# Patient Record
Sex: FEMALE | Race: WHITE | NOT HISPANIC OR LATINO | URBAN - METROPOLITAN AREA
[De-identification: names, ages, dates, MRNs, and addresses within clinical notes are randomized per-mention and may not be internally consistent; named-entity substitution may affect disease eponyms.]

---

## 2017-09-26 ENCOUNTER — OFFICE (OUTPATIENT)
Dept: URBAN - METROPOLITAN AREA CLINIC 11 | Facility: CLINIC | Age: 32
End: 2017-09-26

## 2017-09-26 VITALS
HEART RATE: 75 BPM | HEIGHT: 67 IN | SYSTOLIC BLOOD PRESSURE: 123 MMHG | DIASTOLIC BLOOD PRESSURE: 72 MMHG | WEIGHT: 195 LBS

## 2017-09-26 DIAGNOSIS — R19.7 DIARRHEA, UNSPECIFIED: ICD-10-CM

## 2017-09-26 DIAGNOSIS — K21.9 GASTRO-ESOPHAGEAL REFLUX DISEASE WITHOUT ESOPHAGITIS: ICD-10-CM

## 2017-09-26 LAB
C-REACTIVE PROTEIN, QUANT: 3 MG/L (ref 0–4.9)
CBC, PLATELET, NO DIFFERENTIAL: HEMATOCRIT: 37.3 % (ref 34–46.6)
CBC, PLATELET, NO DIFFERENTIAL: HEMOGLOBIN: 12.6 G/DL (ref 11.1–15.9)
CBC, PLATELET, NO DIFFERENTIAL: MCH: 30 PG (ref 26.6–33)
CBC, PLATELET, NO DIFFERENTIAL: MCHC: 33.8 G/DL (ref 31.5–35.7)
CBC, PLATELET, NO DIFFERENTIAL: MCV: 89 FL (ref 79–97)
CBC, PLATELET, NO DIFFERENTIAL: PLATELETS: 308 X10E3/UL (ref 150–379)
CBC, PLATELET, NO DIFFERENTIAL: RBC: 4.2 X10E6/UL (ref 3.77–5.28)
CBC, PLATELET, NO DIFFERENTIAL: RDW: 13.4 % (ref 12.3–15.4)
CBC, PLATELET, NO DIFFERENTIAL: WBC: 9.1 X10E3/UL (ref 3.4–10.8)

## 2017-09-26 PROCEDURE — 99203 OFFICE O/P NEW LOW 30 MIN: CPT | Performed by: INTERNAL MEDICINE

## 2017-09-26 RX ORDER — HYOSCYAMINE SULFATE 0.12 MG/1
TABLET, ORALLY DISINTEGRATING ORAL
Qty: 30 | Refills: 0 | Status: COMPLETED
Start: 2017-09-26 | End: 2018-03-02

## 2017-09-26 RX ORDER — PANTOPRAZOLE SODIUM 40 MG/1
TABLET, DELAYED RELEASE ORAL
Qty: 30 | Refills: 2 | Status: COMPLETED
Start: 2017-09-26 | End: 2018-03-02

## 2017-09-26 NOTE — SERVICENOTES
We discussed her history of GERD, lack of warning sx/sx, a Gerd diet, and the use/risks of Protonix.  We also discussed her diarrhea and a dif dx including IBS, dieatry issues, celiac diseaes, infectious issues, IBD, bacterial overgrowth, etc...  I would do the labs as above (if she is anemic we discussed an endoscopic eval) and check her stools (she was concerned about infectious issues which would seem less likely at this point). I would have her use Levsin prn and ordered the breath testing for bacterial overgrowth.

## 2017-09-26 NOTE — SERVICEHPINOTES
I've been having reflux for several years.  She has been taking Tums to control the heartburn all most every day.  She has been on prescription meds but did not recall what it was but that it helped. She will ahve heartburn and acid regurgitation. This is worse with lying down.  She has noted particular issues with fried foods, coffee, and "everything".  Sometimes she has reflux without eating.  She has not had dysphagia.  She has lost some weight over the summer and her reflux did improve some. She has had issues with diarrhea in the mornings which started this summer.  She has been avoiding some gluten products but not consistently.  She has noted a feeling of needing to have a stool after eating, but this has been the normal pattern predating this diarrhea.  The diarrhea currently seems to occur in the mornings with lower abdominal cramping.  She will have upto four episodes of diarrhea in the mornings and then not had a stool later in the day.  She does not eat breakfast.  If she does not eat the diarrhea may be a bit better.  She has not had blood in her stools. She tried a [employer] probiotic which helpd a little. She has not had a family history of colon cancer or colon polyps.  There is not a family history of IBD or celiac disease.

## 2017-09-27 LAB
GI PROFILE, STOOL, PCR: ADENOVIRUS F 40/41: NOT DETECTED
GI PROFILE, STOOL, PCR: ASTROVIRUS: NOT DETECTED
GI PROFILE, STOOL, PCR: C DIFFICILE TOXIN A/B: NOT DETECTED
GI PROFILE, STOOL, PCR: CAMPYLOBACTER: NOT DETECTED
GI PROFILE, STOOL, PCR: CRYPTOSPORIDIUM: NOT DETECTED
GI PROFILE, STOOL, PCR: CYCLOSPORA CAYETANENSIS: NOT DETECTED
GI PROFILE, STOOL, PCR: E COLI O157: (no result)
GI PROFILE, STOOL, PCR: ENTAMOEBA HISTOLYTICA: NOT DETECTED
GI PROFILE, STOOL, PCR: ENTEROAGGREGATIVE E COLI: NOT DETECTED
GI PROFILE, STOOL, PCR: ENTEROPATHOGENIC E COLI: NOT DETECTED
GI PROFILE, STOOL, PCR: ENTEROTOXIGENIC E COLI: NOT DETECTED
GI PROFILE, STOOL, PCR: GIARDIA LAMBLIA: NOT DETECTED
GI PROFILE, STOOL, PCR: NOROVIRUS GI/GII: NOT DETECTED
GI PROFILE, STOOL, PCR: PLESIOMONAS SHIGELLOIDES: NOT DETECTED
GI PROFILE, STOOL, PCR: ROTAVIRUS A: NOT DETECTED
GI PROFILE, STOOL, PCR: SALMONELLA: NOT DETECTED
GI PROFILE, STOOL, PCR: SAPOVIRUS: NOT DETECTED
GI PROFILE, STOOL, PCR: SHIGA-TOXIN-PRODUCING E COLI: NOT DETECTED
GI PROFILE, STOOL, PCR: SHIGELLA/ENTEROINVASIVE E COLI: NOT DETECTED
GI PROFILE, STOOL, PCR: VIBRIO CHOLERAE: NOT DETECTED
GI PROFILE, STOOL, PCR: VIBRIO: NOT DETECTED
GI PROFILE, STOOL, PCR: YERSINIA ENTEROCOLITICA: NOT DETECTED

## 2018-02-27 ENCOUNTER — OFFICE (OUTPATIENT)
Dept: URBAN - METROPOLITAN AREA CLINIC 11 | Facility: CLINIC | Age: 33
End: 2018-02-27
Payer: COMMERCIAL

## 2018-02-27 DIAGNOSIS — R19.7 DIARRHEA, UNSPECIFIED: ICD-10-CM

## 2018-02-27 PROCEDURE — 91065 BREATH HYDROGEN/METHANE TEST: CPT | Performed by: INTERNAL MEDICINE

## 2018-03-02 ENCOUNTER — OFFICE (OUTPATIENT)
Dept: URBAN - METROPOLITAN AREA CLINIC 11 | Facility: CLINIC | Age: 33
End: 2018-03-02

## 2018-03-02 VITALS
HEIGHT: 67 IN | DIASTOLIC BLOOD PRESSURE: 84 MMHG | WEIGHT: 188 LBS | HEART RATE: 73 BPM | SYSTOLIC BLOOD PRESSURE: 128 MMHG

## 2018-03-02 DIAGNOSIS — K21.9 GASTRO-ESOPHAGEAL REFLUX DISEASE WITHOUT ESOPHAGITIS: ICD-10-CM

## 2018-03-02 DIAGNOSIS — A04.9 BACTERIAL INTESTINAL INFECTION, UNSPECIFIED: ICD-10-CM

## 2018-03-02 PROCEDURE — 99213 OFFICE O/P EST LOW 20 MIN: CPT | Performed by: INTERNAL MEDICINE

## 2018-03-02 RX ORDER — FAMOTIDINE 40 MG/1
40 TABLET, FILM COATED ORAL
Qty: 90 | Refills: 1 | Status: COMPLETED
Start: 2018-03-02 | End: 2021-03-05

## 2018-03-02 NOTE — SERVICENOTES
We discused her issues with GERD, a GERD diet, her lifestyle issues, and the need for control with additional medications to help avoid longerterm issues such as rings/strictures.  I would like to do an EGD with the persistent GERD but would wait until after she is treated for bacterial overgrowth (d/w pt the positive studies, use of the Xifaxan, clearance rates and associated symptoms) incase further studies are needed.

## 2018-03-02 NOTE — SERVICEHPINOTES
"I'm doing great and eating ."  However, she stated that she is still having reflux and heartburn "pretty often".  She has the reflux with heartburn daily.  She has not been on been on meds for the heartburn recently but had been on Prilosec OTC at one point.  "I am trying to avoid taking any medication." She had been on the Protonix which helped but she stopped it as well.  She has not had issues with dysphagia. She did get her lactulose study completed and it was positive.  She was given script for XIfaxn today.  She has had diffuse bloating across her abdomen and into her back.  It seems to also happend with her periods.

## 2018-05-04 ENCOUNTER — OFFICE (OUTPATIENT)
Dept: URBAN - METROPOLITAN AREA CLINIC 11 | Facility: CLINIC | Age: 33
End: 2018-05-04

## 2018-05-04 VITALS
SYSTOLIC BLOOD PRESSURE: 119 MMHG | HEART RATE: 52 BPM | HEIGHT: 67 IN | DIASTOLIC BLOOD PRESSURE: 73 MMHG | WEIGHT: 188 LBS

## 2018-05-04 DIAGNOSIS — K21.9 GASTRO-ESOPHAGEAL REFLUX DISEASE WITHOUT ESOPHAGITIS: ICD-10-CM

## 2018-05-04 DIAGNOSIS — A04.9 BACTERIAL INTESTINAL INFECTION, UNSPECIFIED: ICD-10-CM

## 2018-05-04 DIAGNOSIS — R19.7 DIARRHEA, UNSPECIFIED: ICD-10-CM

## 2018-05-04 PROCEDURE — 99213 OFFICE O/P EST LOW 20 MIN: CPT | Performed by: INTERNAL MEDICINE

## 2018-05-04 NOTE — SERVICEHPINOTES
She presents for f/u after her tx with Xifaxan.  She did not think that it helped with her symptoms.  She has continued with her bloating and also with the diarrhea.  She stated tht they symptoms come and go.  She thought that the bloating was across her abdomen and is associated with nausea.  She has noted that some food will trigger her symptoms, such as [id number].  She has noted that fried foods are an issue.  She has had an improvement in her heartburn with the Pepcid.  Her symptoms start in the mornings and last for quite a while. She is having frequent stools in the mornings.  After stooling some of the symptoms are better.

## 2018-05-10 ENCOUNTER — AMBULATORY SURGICAL CENTER (OUTPATIENT)
Dept: URBAN - METROPOLITAN AREA SURGERY 3 | Facility: SURGERY | Age: 33
End: 2018-05-10

## 2018-05-10 ENCOUNTER — OFFICE (OUTPATIENT)
Dept: URBAN - METROPOLITAN AREA PATHOLOGY 22 | Facility: PATHOLOGY | Age: 33
End: 2018-05-10
Payer: COMMERCIAL

## 2018-05-10 VITALS
HEART RATE: 76 BPM | HEIGHT: 68 IN | OXYGEN SATURATION: 95 % | RESPIRATION RATE: 16 BRPM | DIASTOLIC BLOOD PRESSURE: 70 MMHG | WEIGHT: 180 LBS | RESPIRATION RATE: 20 BRPM | DIASTOLIC BLOOD PRESSURE: 71 MMHG | HEART RATE: 104 BPM | HEART RATE: 80 BPM | HEART RATE: 80 BPM | SYSTOLIC BLOOD PRESSURE: 120 MMHG | OXYGEN SATURATION: 96 % | SYSTOLIC BLOOD PRESSURE: 115 MMHG | DIASTOLIC BLOOD PRESSURE: 71 MMHG | DIASTOLIC BLOOD PRESSURE: 62 MMHG | WEIGHT: 180 LBS | OXYGEN SATURATION: 97 % | TEMPERATURE: 98 F | SYSTOLIC BLOOD PRESSURE: 126 MMHG | SYSTOLIC BLOOD PRESSURE: 118 MMHG | HEART RATE: 88 BPM | RESPIRATION RATE: 20 BRPM | DIASTOLIC BLOOD PRESSURE: 72 MMHG | HEART RATE: 104 BPM | DIASTOLIC BLOOD PRESSURE: 72 MMHG | DIASTOLIC BLOOD PRESSURE: 62 MMHG | OXYGEN SATURATION: 96 % | DIASTOLIC BLOOD PRESSURE: 74 MMHG | OXYGEN SATURATION: 95 % | DIASTOLIC BLOOD PRESSURE: 70 MMHG | SYSTOLIC BLOOD PRESSURE: 120 MMHG | SYSTOLIC BLOOD PRESSURE: 126 MMHG | SYSTOLIC BLOOD PRESSURE: 118 MMHG | RESPIRATION RATE: 22 BRPM | RESPIRATION RATE: 22 BRPM | SYSTOLIC BLOOD PRESSURE: 115 MMHG | RESPIRATION RATE: 18 BRPM | OXYGEN SATURATION: 97 % | HEART RATE: 76 BPM | HEIGHT: 68 IN | RESPIRATION RATE: 16 BRPM | DIASTOLIC BLOOD PRESSURE: 74 MMHG | RESPIRATION RATE: 18 BRPM | TEMPERATURE: 96.9 F | TEMPERATURE: 96.9 F | HEART RATE: 88 BPM | TEMPERATURE: 98 F

## 2018-05-10 DIAGNOSIS — K31.7 POLYP OF STOMACH AND DUODENUM: ICD-10-CM

## 2018-05-10 DIAGNOSIS — A04.9 BACTERIAL INTESTINAL INFECTION, UNSPECIFIED: ICD-10-CM

## 2018-05-10 DIAGNOSIS — K52.9 NONINFECTIVE GASTROENTERITIS AND COLITIS, UNSPECIFIED: ICD-10-CM

## 2018-05-10 DIAGNOSIS — K44.9 DIAPHRAGMATIC HERNIA WITHOUT OBSTRUCTION OR GANGRENE: ICD-10-CM

## 2018-05-10 DIAGNOSIS — K29.50 UNSPECIFIED CHRONIC GASTRITIS WITHOUT BLEEDING: ICD-10-CM

## 2018-05-10 DIAGNOSIS — K64.8 OTHER HEMORRHOIDS: ICD-10-CM

## 2018-05-10 DIAGNOSIS — R19.7 DIARRHEA, UNSPECIFIED: ICD-10-CM

## 2018-05-10 DIAGNOSIS — K22.2 ESOPHAGEAL OBSTRUCTION: ICD-10-CM

## 2018-05-10 PROBLEM — K29.70 GASTRITIS, UNSPECIFIED, WITHOUT BLEEDING: Status: ACTIVE | Noted: 2018-05-10

## 2018-05-10 LAB
AEROBIC BACTERIAL CULTURE: (no result)
AEROBIC BACTERIAL CULTURE: (no result)
RESULT: RESULT 1: (no result)
RESULT: RESULT 1: (no result)

## 2018-05-10 PROCEDURE — 43239 EGD BIOPSY SINGLE/MULTIPLE: CPT | Performed by: INTERNAL MEDICINE

## 2018-05-10 PROCEDURE — 88313 SPECIAL STAINS GROUP 2: CPT | Performed by: INTERNAL MEDICINE

## 2018-05-10 PROCEDURE — 88342 IMHCHEM/IMCYTCHM 1ST ANTB: CPT | Performed by: INTERNAL MEDICINE

## 2018-05-10 PROCEDURE — 43450 DILATE ESOPHAGUS 1/MULT PASS: CPT | Mod: 59 | Performed by: INTERNAL MEDICINE

## 2018-05-10 PROCEDURE — 88305 TISSUE EXAM BY PATHOLOGIST: CPT | Performed by: INTERNAL MEDICINE

## 2018-05-10 PROCEDURE — 45331 SIGMOIDOSCOPY AND BIOPSY: CPT | Mod: 51 | Performed by: INTERNAL MEDICINE

## 2018-05-10 PROCEDURE — 88341 IMHCHEM/IMCYTCHM EA ADD ANTB: CPT | Performed by: INTERNAL MEDICINE

## 2018-05-10 RX ORDER — PANTOPRAZOLE SODIUM 40 MG/1
TABLET, DELAYED RELEASE ORAL
Qty: 90 | Refills: 1 | Status: COMPLETED
Start: 2018-05-10 | End: 2018-06-21

## 2018-06-21 ENCOUNTER — OFFICE (OUTPATIENT)
Dept: URBAN - METROPOLITAN AREA CLINIC 11 | Facility: CLINIC | Age: 33
End: 2018-06-21

## 2018-06-21 VITALS
HEIGHT: 68 IN | DIASTOLIC BLOOD PRESSURE: 71 MMHG | WEIGHT: 191 LBS | SYSTOLIC BLOOD PRESSURE: 110 MMHG | HEART RATE: 62 BPM

## 2018-06-21 DIAGNOSIS — A04.9 BACTERIAL INTESTINAL INFECTION, UNSPECIFIED: ICD-10-CM

## 2018-06-21 DIAGNOSIS — R10.9 UNSPECIFIED ABDOMINAL PAIN: ICD-10-CM

## 2018-06-21 PROCEDURE — 99213 OFFICE O/P EST LOW 20 MIN: CPT | Performed by: INTERNAL MEDICINE

## 2018-06-21 NOTE — SERVICENOTES
She has had a good improvement in her symptoms related to the bacterial overgrowth.  We discussed her diet, need to avoid lactose, and risks of recurrent issues. With her upper abdominal symptoms, though vague, and given her plans to get pregnant, I would like an u/s to r/o gall stones.

## 2018-06-21 NOTE — SERVICEHPINOTES
She stated that since the tx of her bacterial overgrowth with strep viridans with Xifaxan and Augmentin that her bloating and gas issues have markedly improved.  She has still had some issues with aches and fullness/discomfort.  She has had issues wtih lactose but still uses it.  We reviewed her bx and studies.

## 2018-07-10 ENCOUNTER — OFFICE (OUTPATIENT)
Dept: URBAN - METROPOLITAN AREA CLINIC 19 | Facility: CLINIC | Age: 33
End: 2018-07-10
Payer: COMMERCIAL

## 2018-07-10 DIAGNOSIS — R10.9 UNSPECIFIED ABDOMINAL PAIN: ICD-10-CM

## 2018-07-10 PROCEDURE — 76700 US EXAM ABDOM COMPLETE: CPT | Performed by: INTERNAL MEDICINE

## 2021-03-05 ENCOUNTER — OFFICE (OUTPATIENT)
Dept: URBAN - METROPOLITAN AREA CLINIC 11 | Facility: CLINIC | Age: 36
End: 2021-03-05

## 2021-03-05 VITALS
OXYGEN SATURATION: 100 % | HEART RATE: 60 BPM | WEIGHT: 198 LBS | SYSTOLIC BLOOD PRESSURE: 126 MMHG | HEIGHT: 68 IN | DIASTOLIC BLOOD PRESSURE: 75 MMHG

## 2021-03-05 DIAGNOSIS — K76.89 OTHER SPECIFIED DISEASES OF LIVER: ICD-10-CM

## 2021-03-05 DIAGNOSIS — R19.5 OTHER FECAL ABNORMALITIES: ICD-10-CM

## 2021-03-05 DIAGNOSIS — A04.9 BACTERIAL INTESTINAL INFECTION, UNSPECIFIED: ICD-10-CM

## 2021-03-05 PROCEDURE — 99214 OFFICE O/P EST MOD 30 MIN: CPT | Performed by: NURSE PRACTITIONER

## 2021-03-05 RX ORDER — RIFAXIMIN 200 MG/1
TABLET ORAL
Qty: 42 | Refills: 0 | Status: ACTIVE
Start: 2021-03-05

## 2021-03-05 RX ORDER — RIFAXIMIN 550 MG/1
TABLET ORAL
Qty: 42 | Refills: 0 | Status: ACTIVE
Start: 2021-03-05

## 2021-03-05 NOTE — SERVICEHPINOTES
Ms. Hernández is a 35 year old female that presents today for bloating, nausea. This has been going on for approximately 1-2 months. She denies any changes in medication and taking antibiotics. She notes that she wakes up with nausea in the mornings. She notes that she has mushy stools. She notes that sometimes she cannot leave the house in the mornings d/t having to have several bowel movements.  Associated symptoms include abdominal cramping and flatulence. She denies frequent heartburn, reflux, and epigastric pain. She denies hematochezia and melena. FONT style="BACKGROUND-COLOR: #ffffcc" visited="true"BRFONT style="BACKGROUND-COLOR: #dddddd" visited="true"BRMRI in 2018 with two small areas of focal nodular hyperplasia/FONTBR/FONT

## 2021-03-05 NOTE — SERVICENOTES
We discussed reoccurrence of her symptoms and evaluation and treatment options. She would like to treat as SIBO since she was positive last time and she is having the same symptoms that occurred the last time she was here. We discussed doing the MRI to follow up on the areas of focal nodular hyperplasia of the liver and she would like to wait on getting a follow up scan at this time.

## 2022-01-26 NOTE — SERVICENOTES
We discussed the findings suggestive of bacterial overgrowth and symptosm of IBS.  With her lack of a distinct improvement, I would do the EGD with cultures/bx, and the FSC with bx as we discussed.
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 meds: on Jardiance, insulin, rosuvastatin

## 2022-05-13 ENCOUNTER — OFFICE (OUTPATIENT)
Dept: URBAN - METROPOLITAN AREA CLINIC 11 | Facility: CLINIC | Age: 37
End: 2022-05-13

## 2022-05-13 VITALS
HEIGHT: 68 IN | WEIGHT: 199 LBS | DIASTOLIC BLOOD PRESSURE: 86 MMHG | SYSTOLIC BLOOD PRESSURE: 128 MMHG | HEART RATE: 83 BPM | OXYGEN SATURATION: 98 %

## 2022-05-13 DIAGNOSIS — A04.9 BACTERIAL INTESTINAL INFECTION, UNSPECIFIED: ICD-10-CM

## 2022-05-13 DIAGNOSIS — K58.0 IRRITABLE BOWEL SYNDROME WITH DIARRHEA: ICD-10-CM

## 2022-05-13 PROCEDURE — 99214 OFFICE O/P EST MOD 30 MIN: CPT | Performed by: INTERNAL MEDICINE

## 2022-05-13 RX ORDER — RIFAXIMIN 550 MG/1
TABLET ORAL
Qty: 42 | Refills: 0 | Status: ACTIVE
Start: 2022-05-13

## 2022-05-13 NOTE — SERVICENOTES
We discussed his sx, prior bacterial overgrowth, blunted villi on small bowel bx in 2018, the trial of Xifaxan and also the lactose free diet plan. I would add the celiac studies noting her diet hx and small bowel bx (no lymphocytosis was noted and she had bacterial overgrowth at the time).

## 2022-05-13 NOTE — SERVICEHPINOTES
Pt presents stating that she has been having issues with "SIBO" again.  She has been having "hard core burping" and bloating and after eating.  The gas is painful.  It is worse after eating "bad stuff". She has issues with donuts, cakes,, sweets, preservative foods, fried foods and breads.. She has had some diarrhea with soft urgent stools after eating as well.  Most of her stooling occurs in the mornings.  She has been having several stools in the mornings, sometimes around 7.  She has had some cramping. Her sx are better after stooling and belching.   She has been "about 7 mos pregnant" with the bloating. 
br
wei She is not currently pregnant. 
wei carvajal She was given Xifaxan at her last visit but it was too expensive at [facility]. .
wei